# Patient Record
Sex: FEMALE | Race: WHITE
[De-identification: names, ages, dates, MRNs, and addresses within clinical notes are randomized per-mention and may not be internally consistent; named-entity substitution may affect disease eponyms.]

---

## 2017-02-22 ENCOUNTER — HOSPITAL ENCOUNTER (EMERGENCY)
Dept: HOSPITAL 25 - UCEAST | Age: 22
Discharge: HOME | End: 2017-02-22
Payer: COMMERCIAL

## 2017-02-22 VITALS — DIASTOLIC BLOOD PRESSURE: 92 MMHG | SYSTOLIC BLOOD PRESSURE: 149 MMHG

## 2017-02-22 DIAGNOSIS — Z87.891: ICD-10-CM

## 2017-02-22 DIAGNOSIS — L01.00: Primary | ICD-10-CM

## 2017-02-22 DIAGNOSIS — Z88.0: ICD-10-CM

## 2017-02-22 PROCEDURE — 99212 OFFICE O/P EST SF 10 MIN: CPT

## 2017-02-22 PROCEDURE — G0463 HOSPITAL OUTPT CLINIC VISIT: HCPCS

## 2019-03-07 ENCOUNTER — HOSPITAL ENCOUNTER (INPATIENT)
Dept: HOSPITAL 25 - MCHOBOUT | Age: 24
LOS: 3 days | Discharge: HOME | DRG: 560 | End: 2019-03-10
Attending: MIDWIFE | Admitting: MIDWIFE
Payer: COMMERCIAL

## 2019-03-07 DIAGNOSIS — Z3A.39: ICD-10-CM

## 2019-03-07 LAB
ALBUMIN SERPL BCG-MCNC: 3.3 G/DL (ref 3.2–5.2)
ALBUMIN/GLOB SERPL: 1.1 {RATIO} (ref 1–3)
ALP SERPL-CCNC: 96 U/L (ref 34–104)
ALT SERPL W P-5'-P-CCNC: 11 U/L (ref 7–52)
ANION GAP SERPL CALC-SCNC: 7 MMOL/L (ref 2–11)
AST SERPL-CCNC: 17 U/L (ref 13–39)
BASOPHILS # BLD AUTO: 0 10^3/UL (ref 0–0.2)
BUN SERPL-MCNC: 13 MG/DL (ref 6–24)
BUN/CREAT SERPL: 22 (ref 8–20)
CALCIUM SERPL-MCNC: 8.9 MG/DL (ref 8.6–10.3)
CHLORIDE SERPL-SCNC: 108 MMOL/L (ref 101–111)
EOSINOPHIL # BLD AUTO: 0.1 10^3/UL (ref 0–0.6)
GLOBULIN SER CALC-MCNC: 3 G/DL (ref 2–4)
GLUCOSE SERPL-MCNC: 102 MG/DL (ref 70–100)
HCO3 SERPL-SCNC: 22 MMOL/L (ref 22–32)
HCT VFR BLD AUTO: 36 % (ref 35–47)
HGB BLD-MCNC: 12.1 G/DL (ref 12–16)
LYMPHOCYTES # BLD AUTO: 2.8 10^3/UL (ref 1–4.8)
MCH RBC QN AUTO: 28 PG (ref 27–31)
MCHC RBC AUTO-ENTMCNC: 34 G/DL (ref 31–36)
MCV RBC AUTO: 83 FL (ref 80–97)
MONOCYTES # BLD AUTO: 1 10^3/UL (ref 0–0.8)
NEUTROPHILS # BLD AUTO: 6.9 10^3/UL (ref 1.5–7.7)
NRBC # BLD AUTO: 0 10^3/UL
NRBC BLD QL AUTO: 0.1
PLATELET # BLD AUTO: 162 10^3/UL (ref 150–450)
POTASSIUM SERPL-SCNC: 4 MMOL/L (ref 3.5–5)
PROT SERPL-MCNC: 6.3 G/DL (ref 6.4–8.9)
RBC # BLD AUTO: 4.32 10^6/UL (ref 4–5.4)
SODIUM SERPL-SCNC: 137 MMOL/L (ref 135–145)
URATE SERPL-MCNC: 5.5 MG/DL (ref 2.3–6.6)
WBC # BLD AUTO: 10.8 10^3/UL (ref 3.5–10.8)

## 2019-03-07 PROCEDURE — 85025 COMPLETE CBC W/AUTO DIFF WBC: CPT

## 2019-03-07 PROCEDURE — 84550 ASSAY OF BLOOD/URIC ACID: CPT

## 2019-03-07 PROCEDURE — 86850 RBC ANTIBODY SCREEN: CPT

## 2019-03-07 PROCEDURE — 36415 COLL VENOUS BLD VENIPUNCTURE: CPT

## 2019-03-07 PROCEDURE — 86900 BLOOD TYPING SEROLOGIC ABO: CPT

## 2019-03-07 PROCEDURE — 80053 COMPREHEN METABOLIC PANEL: CPT

## 2019-03-07 PROCEDURE — 81003 URINALYSIS AUTO W/O SCOPE: CPT

## 2019-03-07 PROCEDURE — 86901 BLOOD TYPING SEROLOGIC RH(D): CPT

## 2019-03-07 NOTE — HP
General Information





- Reason for Visit


Gestational hypertension, gestational diabetes, IOL








- General Information


Maternal Age: 24


Grav: 4


Para: 1


SAB: 2


IEA: 0





Estimated Due Date: 19


Determined By: Early Ultrasound


Maternal Blood Type and Rh: A Positive





- Results this Pregnancy


Serology/RPR Result: Non-Reactive


Rubella Result: Immune


HBsAg Result: Negative


HIV Result: Negative


GBS Culture Result: Negative





Past Medical History


Delivery History: Hx Uncomplicated Vaginal Delivery


Pertinent Past Medical History: Non-Contributory


Pertinent Past Surgical History: None


Pertinent Family History: See  Records - DM, HTN, creast and colon CA, 

CVD





- Antepartal Records


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

, Gestational diabetes (diet controlled), Gestational Hypertension





Review of Systems


Constitutional: Comfortable


CV Complaint: No


Respiratory: Shortness of Breath: No


Gastrointestinal: No Nausea/Vomiting, Normal Bowel Movement


Genitourinary: No Dysuria, No Bleeding, No Leaking Fluid


Musculoskeletal: No Complaint, No Epigastric Pain


Neurological: No Headache


Fetal Movement: Normal





Exam


Allergies/Adverse Reactions: 


Allergies





MS Eggs or Egg-derived Products [Eggs or Egg-derived Products] Allergy (Severe, 

Verified 17 20:36)


 ITCHING, RED SPOTS


MS Penicillins [Penicillins] Allergy (Severe, Verified 17 20:36)


 Anaphylatic Shock


metronidazole Allergy (Verified 19 19:24)


 Swelling


 throat swelling 











T:97.6, P:102, R:20, BP:151/85, repeat BP: 135/81, O2: 100%


Lab Values - Entire Visit: 


 Laboratory Tests











  19





  18:45 18:45 18:45


 


WBC  10.8  


 


RBC  4.32  


 


Hgb  12.1  


 


Hct  36  


 


MCV  83  


 


MCH  28  


 


MCHC  34  


 


RDW  14  


 


Plt Count  162  


 


MPV  11.9 H  


 


Neut % (Auto)  64.2  


 


Lymph % (Auto)  26.0  


 


Mono % (Auto)  8.8  


 


Eos % (Auto)  0.7  


 


Baso % (Auto)  0.3  


 


Absolute Neuts (auto)  6.9  


 


Absolute Lymphs (auto)  2.8  


 


Absolute Monos (auto)  1.0 H  


 


Absolute Eos (auto)  0.1  


 


Absolute Basos (auto)  0  


 


Absolute Nucleated RBC  0  


 


Nucleated RBC %  0.1  


 


Sodium   137 


 


Potassium   4.0 


 


Chloride   108 


 


Carbon Dioxide   22 


 


Anion Gap   7 


 


BUN   13 


 


Creatinine   0.59 


 


Est GFR ( Amer)   149.1 


 


Est GFR (Non-Af Amer)   123.2 


 


BUN/Creatinine Ratio   22.0 H 


 


Glucose   102 H 


 


Uric Acid   5.5 


 


Calcium   8.9 


 


Total Bilirubin   0.20 


 


AST   17 


 


ALT   11 


 


Alkaline Phosphatase   96 


 


Total Protein   6.3 L 


 


Albumin   3.3 


 


Globulin   3.0 


 


Albumin/Globulin Ratio   1.1 


 


Blood Type    A Positive


 


Antibody Screen    Negative














- Measurements


Height: 5 ft


Weight: 213 lb


Weight in lbs: [phone]


Body Mass Index (BMI): 41.5


Pre-Pregnancy Weight: 193 lb


Weight Gained This Pregnancy: 20 lbs and 0 ozs





- Exam


Breast: Breast Exam Deferred


CVA: No CVA Tenderness


Extremities: No Edema


Heart: Normal Rhythm/Heart Sounds


HEENT: No Significant Findings


Lungs: Clear Bilaterally


Rectal: Rectal Exam Deferred


Reflexes: DTR 2+


Thyroid: No Thyromegaly





- Abdominal Exam


Abdomen Exam: Fundal Height Consistent with Dates





- Ultrasound/Biophysical Profile


Ultrasound Status: Not Done





Targeted Exam Findings


Estimated Fetal Weight: 8lbs


Cervical Exam: 3cm


Effacement: 70%


Station: -2


Presenting Part: Vertex


Membrane Status: Intact


Bleeding/Discharge: None





EFM Findings





- External Fetal Monitor Findings


Baseline Fetal Heart Rate: 135


External Fetal Monitor Findings: Accelerations Present, No Pattern of Variable 

or Late Decelerations, Variability Moderate, Baseline Stable


Contractions: Irregular, Mild, < 45 Seconds





Assessment/Plan





- Assessment





24 y.o. , 39w3d EGA, gestational hypertension, gestational diabetes (

diet controlled), induction of labor





- Obstetrical Risk Factors


Obstetrical Risk Factors: Gestational Hypertension, Gestational Diabetes





- Plan


Plan: Induction





- Date/Time of Admission


Date of Admission: 18


Time of Admission: 20:02

## 2019-03-08 PROCEDURE — 0HQ9XZZ REPAIR PERINEUM SKIN, EXTERNAL APPROACH: ICD-10-PCS | Performed by: MIDWIFE

## 2019-03-08 PROCEDURE — 3E033VJ INTRODUCTION OF OTHER HORMONE INTO PERIPHERAL VEIN, PERCUTANEOUS APPROACH: ICD-10-PCS | Performed by: MIDWIFE

## 2019-03-08 PROCEDURE — 10907ZC DRAINAGE OF AMNIOTIC FLUID, THERAPEUTIC FROM PRODUCTS OF CONCEPTION, VIA NATURAL OR ARTIFICIAL OPENING: ICD-10-PCS | Performed by: MIDWIFE

## 2019-03-08 RX ADMIN — IBUPROFEN PRN MG: 600 TABLET, FILM COATED ORAL at 21:21

## 2019-03-08 RX ADMIN — DOCUSATE SODIUM SCH MG: 100 CAPSULE, LIQUID FILLED ORAL at 21:21

## 2019-03-08 NOTE — PN
Progress Note





- Progress Note


Date of Service: 19


SOAP: 


Subjective:


[Pt reports ctx are increasing in intensity and requests epidural.  Pt 

continues to have LOF.  Pt coping well.]








Objective:


[135 bpm, + accels, -decels, moderate variability. Cervix: 6/90/-1


BP: 149-79, P:81, RL20, T:97.6]





Assessment:


[24 y.o. , 39w 4d, active labor.]








Plan:


[1)  Epidural for pain mgmt


2)  Position changes


3)  Anticipate vaginal delivery]

## 2019-03-08 NOTE — PN
Progress Note





- Progress Note


Date of Service: 19


SOAP: 


Subjective:


[Pt reports increased vaginal pressure, +FM, -VB, +LOF.]








Objective:


[BP: 151/84, R:20, P:110, T:97.7, FHT: 145bpm, + accels, - decels, moderate 

variability, ctx q 2-5min.


cervix: 8/100/0 pitocin at 6.]








Assessment:


[24 y.o. , 39w 3dEGA, active labor]








Plan:


[1)  Position changes


2)  Anticipate vaginal delivery]

## 2019-03-08 NOTE — PN
Progress Note





- Progress Note


Date of Service: 19


SOAP: 


Subjective:


[Pt reports she slept well overnight and started having mild ctx around 4 am.  

Pt denies HA, epigastric pain, and reports + FM, - LOF.]








Objective:


[145/93, re peat, 152/91, and 126/92.  R:118, P:91, T:97.5


FHR: 140 bpm + accels, -decels, moderate variability.  Cervix: 3-4/80/-1  ctx q 

5 mins, mild-mod.





 Laboratory Tests











  19





  18:45 18:45 20:13


 


Plt Count  162  


 


Uric Acid   5.5 


 


AST   17 


 


ALT   11 


 


Urine Protein    Negative


 


Urine Ketones    Negative


 


Urine Glucose    Negative








]








Assessment:


[24 y.o. , 39w 4d EGA, G HTN, GDM diet controlled, IOL]








Plan:


[1)  Ambulation 


2)  Reevaluate in 2 hr and consider augmentation


3)  Cervidil removed


4)  Con't post prandial glucose monitoring and regular BP checks


5)  Dr Kim aware of pt.]

## 2019-03-09 VITALS — DIASTOLIC BLOOD PRESSURE: 81 MMHG | SYSTOLIC BLOOD PRESSURE: 139 MMHG

## 2019-03-09 LAB
BASOPHILS # BLD AUTO: 0.1 10^3/UL (ref 0–0.2)
EOSINOPHIL # BLD AUTO: 0 10^3/UL (ref 0–0.6)
HCT VFR BLD AUTO: 35 % (ref 35–47)
HGB BLD-MCNC: 11.4 G/DL (ref 12–16)
LYMPHOCYTES # BLD AUTO: 3.4 10^3/UL (ref 1–4.8)
MCH RBC QN AUTO: 27 PG (ref 27–31)
MCHC RBC AUTO-ENTMCNC: 33 G/DL (ref 31–36)
MCV RBC AUTO: 83 FL (ref 80–97)
MONOCYTES # BLD AUTO: 1 10^3/UL (ref 0–0.8)
NEUTROPHILS # BLD AUTO: 11.5 10^3/UL (ref 1.5–7.7)
NRBC # BLD AUTO: 0 10^3/UL
NRBC BLD QL AUTO: 0.2
PLATELET # BLD AUTO: 148 10^3/UL (ref 150–450)
RBC # BLD AUTO: 4.22 10^6/UL (ref 4–5.4)
WBC # BLD AUTO: 16.1 10^3/UL (ref 3.5–10.8)

## 2019-03-09 RX ADMIN — ACETAMINOPHEN PRN MG: 325 TABLET ORAL at 09:33

## 2019-03-09 RX ADMIN — IBUPROFEN PRN MG: 600 TABLET, FILM COATED ORAL at 03:33

## 2019-03-09 RX ADMIN — DOCUSATE SODIUM SCH MG: 100 CAPSULE, LIQUID FILLED ORAL at 20:26

## 2019-03-09 RX ADMIN — ACETAMINOPHEN PRN MG: 325 TABLET ORAL at 16:56

## 2019-03-09 RX ADMIN — DOCUSATE SODIUM SCH MG: 100 CAPSULE, LIQUID FILLED ORAL at 14:22

## 2019-03-09 RX ADMIN — IBUPROFEN PRN MG: 600 TABLET, FILM COATED ORAL at 20:26

## 2019-03-09 RX ADMIN — DOCUSATE SODIUM SCH MG: 100 CAPSULE, LIQUID FILLED ORAL at 09:33

## 2019-03-10 RX ADMIN — DOCUSATE SODIUM SCH MG: 100 CAPSULE, LIQUID FILLED ORAL at 08:41

## 2025-01-29 NOTE — UC
Mode of arrival (squad #, walk in, police, etc) : walk-in        Chief complaint(s): shortness of breath        Arrival Note (brief scenario, treatment PTA, etc).: Pt reports increased shortness of breath x 2 weeks         C= \"Have you ever felt that you should Cut down on your drinking?\"  No  A= \"Have people Annoyed you by criticizing your drinking?\"  No  G= \"Have you ever felt bad or Guilty about your drinking?\"  No  E= \"Have you ever had a drink as an Eye-opener first thing in the morning to steady your nerves or to help a hangover?\"  No      Deferred []      Reason for deferring: N/A    *If yes to two or more: probable alcohol abuse.*    Skin Complaint HPI





- HPI Summary


HPI Summary: 





20 yo female with onset of 4 pustules on right ankle weeks ago


has spread


not itchy





no f/c





no hx MRSA





- History of Current Complaint


Chief Complaint: UC


Time Seen by Provider: 02/22/17 21:19


Stated Complaint: WOUND ON ANKLE


Hx Last Menstrual Period: 3 yrs ago


Onset/Duration: Gradual Onset, Lasting Weeks


Skin Exposure Onset/Duration: Minutes Ago


Timing: Constant


Onset Severity: Mild


Current Severity: Mild


Pain Intensity: 4


Pain Scale Used: 0-10 Numeric


Location: Other - right ankle


Character: Swelling, Redness, Raised, Painful


Aggravating: Touch


Alleviating: Nothing





- Allergy/Home Medications


Allergies/Adverse Reactions: 


 Allergies











Allergy/AdvReac Type Severity Reaction Status Date / Time


 


Eggs or Egg-derived Products Allergy Severe ITCHING, Verified 02/22/17 20:36





   RED SPOTS  


 


Penicillins Allergy Severe Anaphylatic Verified 02/22/17 20:36





   Shock  











Home Medications: 


 Home Medications





Loratadine [Claritin]  02/22/17 [History Confirmed 02/22/17]


Ranitidine HCl [Zantac 150 Maximum Streng]  02/22/17 [History]











Review of Systems


Constitutional: Negative


Skin: Rash


Eyes: Negative


ENT: Negative


Respiratory: Negative


Cardiovascular: Negative


Gastrointestinal: Negative


Genitourinary: Negative


Motor: Negative


Neurovascular: Negative


Musculoskeletal: Negative


Neurological: Negative


Psychological: Negative


All Other Systems Reviewed And Are Negative: Yes





PMH/Surg Hx/FS Hx/Imm Hx


Previously Healthy: Yes





- Surgical History


Surgical History: None





- Family History


Known Family History: Positive: Hypertension, Other - psoriasis





- Social History


Alcohol Use: None


Substance Use Type: None


Smoking Status (MU): Former Smoker


Type: Cigarettes


Amount Used/How Often: 1 ppd


Length of Time of Smoking/Using Tobacco: 6 YEARS


Have You Smoked in the Last Year: Yes





- Immunization History


Most Recent Influenza Vaccination: declined


Most Recent Tetanus Shot: 12/15/15


Most Recent Pneumonia Vaccination: not indicated





Physical Exam


Triage Information Reviewed: Yes


Appearance: Well-Appearing, No Pain Distress, Well-Nourished


Vital Signs: 


 Initial Vital Signs











Temp  98.1 F   02/22/17 20:32


 


Pulse  76   02/22/17 20:32


 


Resp  18   02/22/17 20:32


 


BP  149/92   02/22/17 20:32


 


Pulse Ox  100   02/22/17 20:32











Vital Signs Reviewed: Yes


Eyes: Positive: Conjunctiva Clear


ENT: Positive: Hearing grossly normal.  Negative: Nasal congestion, Nasal 

drainage, Trismus, Muffled/hoarse voice


Neck: Positive: Supple, Nontender


Respiratory: Positive: Lungs clear, Normal breath sounds, No respiratory 

distress, No accessory muscle use


Cardiovascular: Positive: RRR, No Murmur


Neurological: Positive: Alert


Psychological Exam: Normal


Skin: Positive: Other - see image





Course/Dx





- Diagnoses


Provider Diagnoses: impetigo





Discharge





- Discharge Plan


Condition: Stable


Disposition: HOME


Prescriptions: 


DOXYcycline CAP(*) [DOXYcycline 100MG CAP(*)] 100 mg PO BID #18 cap


Mupirocin 2% OINT* [Bactroban 2 % Oint*] 1 applic TOPICAL TID #1 tube


Patient Education Materials:  Impetigo (ED)


Referrals: 


Yennifer Chaidez MD [Primary Care Provider] - 5 Days (if nto better)





Images


Feet (Multiple View): 


  __________________________














  __________________________





 1 - red/raised/crusted.  no pus